# Patient Record
Sex: FEMALE | Race: WHITE | NOT HISPANIC OR LATINO
[De-identification: names, ages, dates, MRNs, and addresses within clinical notes are randomized per-mention and may not be internally consistent; named-entity substitution may affect disease eponyms.]

---

## 2017-01-04 ENCOUNTER — APPOINTMENT (OUTPATIENT)
Dept: FAMILY MEDICINE | Facility: CLINIC | Age: 52
End: 2017-01-04

## 2017-01-04 VITALS
WEIGHT: 135 LBS | BODY MASS INDEX: 20.46 KG/M2 | SYSTOLIC BLOOD PRESSURE: 112 MMHG | DIASTOLIC BLOOD PRESSURE: 70 MMHG | HEIGHT: 68 IN

## 2017-01-04 VITALS — TEMPERATURE: 97.8 F

## 2017-01-08 ENCOUNTER — APPOINTMENT (OUTPATIENT)
Dept: FAMILY MEDICINE | Facility: CLINIC | Age: 52
End: 2017-01-08

## 2017-01-08 VITALS
HEIGHT: 68 IN | SYSTOLIC BLOOD PRESSURE: 110 MMHG | DIASTOLIC BLOOD PRESSURE: 80 MMHG | BODY MASS INDEX: 20.46 KG/M2 | WEIGHT: 135 LBS

## 2017-01-31 ENCOUNTER — MEDICATION RENEWAL (OUTPATIENT)
Age: 52
End: 2017-01-31

## 2017-08-04 ENCOUNTER — APPOINTMENT (OUTPATIENT)
Dept: FAMILY MEDICINE | Facility: CLINIC | Age: 52
End: 2017-08-04
Payer: COMMERCIAL

## 2017-08-04 VITALS
HEIGHT: 68 IN | SYSTOLIC BLOOD PRESSURE: 100 MMHG | DIASTOLIC BLOOD PRESSURE: 60 MMHG | BODY MASS INDEX: 20 KG/M2 | WEIGHT: 132 LBS

## 2017-08-04 PROCEDURE — 99396 PREV VISIT EST AGE 40-64: CPT | Mod: 25

## 2017-08-04 PROCEDURE — 36415 COLL VENOUS BLD VENIPUNCTURE: CPT

## 2017-08-04 PROCEDURE — 93000 ELECTROCARDIOGRAM COMPLETE: CPT

## 2017-08-04 RX ORDER — LEVOCETIRIZINE DIHYDROCHLORIDE 5 MG/1
5 TABLET ORAL DAILY
Qty: 14 | Refills: 0 | Status: DISCONTINUED | COMMUNITY
Start: 2017-01-04 | End: 2017-08-04

## 2017-08-05 LAB
ALBUMIN SERPL ELPH-MCNC: 4.2 G/DL
ALP BLD-CCNC: 97 U/L
ALT SERPL-CCNC: 12 U/L
ANION GAP SERPL CALC-SCNC: 11 MMOL/L
APPEARANCE: CLEAR
AST SERPL-CCNC: 15 U/L
BACTERIA: NEGATIVE
BASOPHILS # BLD AUTO: 0.03 K/UL
BASOPHILS NFR BLD AUTO: 0.6 %
BILIRUB SERPL-MCNC: 0.6 MG/DL
BILIRUBIN URINE: NEGATIVE
BLOOD URINE: NEGATIVE
BUN SERPL-MCNC: 15 MG/DL
CALCIUM SERPL-MCNC: 9.2 MG/DL
CHLORIDE SERPL-SCNC: 105 MMOL/L
CHOLEST SERPL-MCNC: 201 MG/DL
CHOLEST/HDLC SERPL: 3.5 RATIO
CO2 SERPL-SCNC: 28 MMOL/L
COLOR: YELLOW
CREAT SERPL-MCNC: 0.77 MG/DL
EOSINOPHIL # BLD AUTO: 0.12 K/UL
EOSINOPHIL NFR BLD AUTO: 2.2 %
GLUCOSE QUALITATIVE U: NORMAL MG/DL
GLUCOSE SERPL-MCNC: 97 MG/DL
HCT VFR BLD CALC: 40.7 %
HDLC SERPL-MCNC: 57 MG/DL
HGB BLD-MCNC: 13 G/DL
HYALINE CASTS: 2 /LPF
IMM GRANULOCYTES NFR BLD AUTO: 0 %
KETONES URINE: NEGATIVE
LDLC SERPL CALC-MCNC: 127 MG/DL
LEUKOCYTE ESTERASE URINE: ABNORMAL
LYMPHOCYTES # BLD AUTO: 2.29 K/UL
LYMPHOCYTES NFR BLD AUTO: 42.6 %
MAN DIFF?: NORMAL
MCHC RBC-ENTMCNC: 31 PG
MCHC RBC-ENTMCNC: 31.9 GM/DL
MCV RBC AUTO: 96.9 FL
MICROSCOPIC-UA: NORMAL
MONOCYTES # BLD AUTO: 0.46 K/UL
MONOCYTES NFR BLD AUTO: 8.6 %
NEUTROPHILS # BLD AUTO: 2.47 K/UL
NEUTROPHILS NFR BLD AUTO: 46 %
NITRITE URINE: NEGATIVE
PH URINE: 6.5
PLATELET # BLD AUTO: 249 K/UL
POTASSIUM SERPL-SCNC: 4.3 MMOL/L
PROT SERPL-MCNC: 7.3 G/DL
PROTEIN URINE: NEGATIVE MG/DL
RBC # BLD: 4.2 M/UL
RBC # FLD: 12.5 %
RED BLOOD CELLS URINE: 3 /HPF
SODIUM SERPL-SCNC: 144 MMOL/L
SPECIFIC GRAVITY URINE: 1.02
SQUAMOUS EPITHELIAL CELLS: 2 /HPF
TRIGL SERPL-MCNC: 86 MG/DL
TSH SERPL-ACNC: 2.78 UIU/ML
UROBILINOGEN URINE: NORMAL MG/DL
WBC # FLD AUTO: 5.37 K/UL
WHITE BLOOD CELLS URINE: 3 /HPF

## 2017-09-06 ENCOUNTER — MEDICATION RENEWAL (OUTPATIENT)
Age: 52
End: 2017-09-06

## 2018-01-05 ENCOUNTER — APPOINTMENT (OUTPATIENT)
Dept: FAMILY MEDICINE | Facility: CLINIC | Age: 53
End: 2018-01-05
Payer: COMMERCIAL

## 2018-01-05 VITALS
OXYGEN SATURATION: 97 % | RESPIRATION RATE: 16 BRPM | TEMPERATURE: 100.4 F | HEART RATE: 90 BPM | WEIGHT: 135 LBS | DIASTOLIC BLOOD PRESSURE: 78 MMHG | HEIGHT: 68 IN | SYSTOLIC BLOOD PRESSURE: 118 MMHG | BODY MASS INDEX: 20.46 KG/M2

## 2018-01-05 DIAGNOSIS — J06.9 ACUTE UPPER RESPIRATORY INFECTION, UNSPECIFIED: ICD-10-CM

## 2018-01-05 PROCEDURE — 99213 OFFICE O/P EST LOW 20 MIN: CPT

## 2018-01-05 RX ORDER — OMEPRAZOLE 40 MG/1
40 CAPSULE, DELAYED RELEASE ORAL
Qty: 30 | Refills: 0 | Status: DISCONTINUED | COMMUNITY
Start: 2017-08-04 | End: 2018-01-05

## 2018-01-24 ENCOUNTER — MEDICATION RENEWAL (OUTPATIENT)
Age: 53
End: 2018-01-24

## 2018-07-24 ENCOUNTER — APPOINTMENT (OUTPATIENT)
Dept: FAMILY MEDICINE | Facility: CLINIC | Age: 53
End: 2018-07-24
Payer: COMMERCIAL

## 2018-07-24 VITALS
BODY MASS INDEX: 20.46 KG/M2 | DIASTOLIC BLOOD PRESSURE: 80 MMHG | WEIGHT: 135 LBS | HEIGHT: 68 IN | SYSTOLIC BLOOD PRESSURE: 110 MMHG | TEMPERATURE: 98.2 F

## 2018-07-24 DIAGNOSIS — H83.09 LABYRINTHITIS, UNSPECIFIED EAR: ICD-10-CM

## 2018-07-24 PROCEDURE — 99213 OFFICE O/P EST LOW 20 MIN: CPT

## 2018-07-24 RX ORDER — SODIUM SULFATE, POTASSIUM SULFATE, MAGNESIUM SULFATE 17.5; 3.13; 1.6 G/ML; G/ML; G/ML
17.5-3.13-1.6 SOLUTION, CONCENTRATE ORAL
Qty: 354 | Refills: 0 | Status: DISCONTINUED | COMMUNITY
Start: 2018-05-03

## 2018-08-20 ENCOUNTER — MEDICATION RENEWAL (OUTPATIENT)
Age: 53
End: 2018-08-20

## 2018-09-09 ENCOUNTER — NON-APPOINTMENT (OUTPATIENT)
Age: 53
End: 2018-09-09

## 2018-09-09 ENCOUNTER — APPOINTMENT (OUTPATIENT)
Dept: FAMILY MEDICINE | Facility: CLINIC | Age: 53
End: 2018-09-09
Payer: COMMERCIAL

## 2018-09-09 VITALS
HEART RATE: 58 BPM | BODY MASS INDEX: 20.46 KG/M2 | WEIGHT: 135 LBS | HEIGHT: 68 IN | OXYGEN SATURATION: 96 % | SYSTOLIC BLOOD PRESSURE: 100 MMHG | DIASTOLIC BLOOD PRESSURE: 66 MMHG | RESPIRATION RATE: 16 BRPM

## 2018-09-09 DIAGNOSIS — R51 HEADACHE: ICD-10-CM

## 2018-09-09 PROCEDURE — 90686 IIV4 VACC NO PRSV 0.5 ML IM: CPT

## 2018-09-09 PROCEDURE — G0008: CPT

## 2018-09-09 PROCEDURE — 93000 ELECTROCARDIOGRAM COMPLETE: CPT

## 2018-09-09 PROCEDURE — 36415 COLL VENOUS BLD VENIPUNCTURE: CPT

## 2018-09-09 PROCEDURE — 99396 PREV VISIT EST AGE 40-64: CPT | Mod: 25

## 2018-09-10 LAB
25(OH)D3 SERPL-MCNC: 23.6 NG/ML
ALBUMIN SERPL ELPH-MCNC: 4.4 G/DL
ALP BLD-CCNC: 113 U/L
ALT SERPL-CCNC: 13 U/L
ANION GAP SERPL CALC-SCNC: 14 MMOL/L
APPEARANCE: CLEAR
AST SERPL-CCNC: 16 U/L
BACTERIA: NEGATIVE
BASOPHILS # BLD AUTO: 0.01 K/UL
BASOPHILS NFR BLD AUTO: 0.2 %
BILIRUB SERPL-MCNC: 0.3 MG/DL
BILIRUBIN URINE: NEGATIVE
BLOOD URINE: NEGATIVE
BUN SERPL-MCNC: 17 MG/DL
CALCIUM SERPL-MCNC: 9.4 MG/DL
CHLORIDE SERPL-SCNC: 105 MMOL/L
CHOLEST SERPL-MCNC: 194 MG/DL
CHOLEST/HDLC SERPL: 3.3 RATIO
CO2 SERPL-SCNC: 25 MMOL/L
COLOR: YELLOW
CREAT SERPL-MCNC: 0.77 MG/DL
EOSINOPHIL # BLD AUTO: 0.16 K/UL
EOSINOPHIL NFR BLD AUTO: 3.3 %
GLUCOSE QUALITATIVE U: NEGATIVE MG/DL
GLUCOSE SERPL-MCNC: 96 MG/DL
HBA1C MFR BLD HPLC: 5.7 %
HCT VFR BLD CALC: 40.1 %
HDLC SERPL-MCNC: 58 MG/DL
HGB BLD-MCNC: 12.7 G/DL
HYALINE CASTS: 2 /LPF
IMM GRANULOCYTES NFR BLD AUTO: 0.2 %
KETONES URINE: NEGATIVE
LDLC SERPL CALC-MCNC: 124 MG/DL
LEUKOCYTE ESTERASE URINE: NEGATIVE
LYMPHOCYTES # BLD AUTO: 2.2 K/UL
LYMPHOCYTES NFR BLD AUTO: 45.1 %
MAN DIFF?: NORMAL
MCHC RBC-ENTMCNC: 30.9 PG
MCHC RBC-ENTMCNC: 31.7 GM/DL
MCV RBC AUTO: 97.6 FL
MICROSCOPIC-UA: NORMAL
MONOCYTES # BLD AUTO: 0.36 K/UL
MONOCYTES NFR BLD AUTO: 7.4 %
NEUTROPHILS # BLD AUTO: 2.14 K/UL
NEUTROPHILS NFR BLD AUTO: 43.8 %
NITRITE URINE: NEGATIVE
PH URINE: 5.5
PLATELET # BLD AUTO: 253 K/UL
POTASSIUM SERPL-SCNC: 4.4 MMOL/L
PROT SERPL-MCNC: 6.7 G/DL
PROTEIN URINE: NEGATIVE MG/DL
RBC # BLD: 4.11 M/UL
RBC # FLD: 12.9 %
RED BLOOD CELLS URINE: 1 /HPF
SODIUM SERPL-SCNC: 144 MMOL/L
SPECIFIC GRAVITY URINE: 1.02
SQUAMOUS EPITHELIAL CELLS: 2 /HPF
TRIGL SERPL-MCNC: 62 MG/DL
TSH SERPL-ACNC: 2.23 UIU/ML
UROBILINOGEN URINE: NEGATIVE MG/DL
WBC # FLD AUTO: 4.88 K/UL
WHITE BLOOD CELLS URINE: 1 /HPF

## 2018-11-18 ENCOUNTER — APPOINTMENT (OUTPATIENT)
Dept: FAMILY MEDICINE | Facility: CLINIC | Age: 53
End: 2018-11-18
Payer: COMMERCIAL

## 2018-11-18 VITALS
SYSTOLIC BLOOD PRESSURE: 90 MMHG | BODY MASS INDEX: 20.88 KG/M2 | DIASTOLIC BLOOD PRESSURE: 60 MMHG | WEIGHT: 133 LBS | HEIGHT: 67 IN

## 2018-11-18 DIAGNOSIS — J30.9 ALLERGIC RHINITIS, UNSPECIFIED: ICD-10-CM

## 2018-11-18 PROCEDURE — 99213 OFFICE O/P EST LOW 20 MIN: CPT

## 2018-11-18 RX ORDER — MECLIZINE HYDROCHLORIDE 25 MG/1
25 TABLET ORAL
Qty: 15 | Refills: 0 | Status: DISCONTINUED | COMMUNITY
Start: 2018-09-19

## 2018-11-27 ENCOUNTER — APPOINTMENT (OUTPATIENT)
Dept: FAMILY MEDICINE | Facility: CLINIC | Age: 53
End: 2018-11-27
Payer: COMMERCIAL

## 2018-11-27 VITALS
HEIGHT: 67 IN | DIASTOLIC BLOOD PRESSURE: 60 MMHG | HEART RATE: 72 BPM | BODY MASS INDEX: 21.35 KG/M2 | RESPIRATION RATE: 16 BRPM | WEIGHT: 136 LBS | OXYGEN SATURATION: 96 % | SYSTOLIC BLOOD PRESSURE: 110 MMHG

## 2018-11-27 DIAGNOSIS — B34.9 VIRAL INFECTION, UNSPECIFIED: ICD-10-CM

## 2018-11-27 PROCEDURE — 99213 OFFICE O/P EST LOW 20 MIN: CPT

## 2018-11-27 RX ORDER — AMOXICILLIN 500 MG/1
500 TABLET, FILM COATED ORAL
Qty: 21 | Refills: 0 | Status: DISCONTINUED | COMMUNITY
Start: 2018-08-20

## 2019-05-09 ENCOUNTER — APPOINTMENT (OUTPATIENT)
Dept: FAMILY MEDICINE | Facility: CLINIC | Age: 54
End: 2019-05-09
Payer: COMMERCIAL

## 2019-05-09 VITALS
HEIGHT: 67 IN | OXYGEN SATURATION: 99 % | DIASTOLIC BLOOD PRESSURE: 68 MMHG | HEART RATE: 61 BPM | SYSTOLIC BLOOD PRESSURE: 100 MMHG | BODY MASS INDEX: 21.5 KG/M2 | WEIGHT: 137 LBS | RESPIRATION RATE: 16 BRPM

## 2019-05-09 DIAGNOSIS — R53.81 OTHER MALAISE: ICD-10-CM

## 2019-05-09 PROCEDURE — 99214 OFFICE O/P EST MOD 30 MIN: CPT | Mod: 25

## 2019-05-09 PROCEDURE — 36415 COLL VENOUS BLD VENIPUNCTURE: CPT

## 2019-05-09 RX ORDER — PREDNISONE 20 MG/1
20 TABLET ORAL
Qty: 12 | Refills: 0 | Status: DISCONTINUED | COMMUNITY
Start: 2018-11-27 | End: 2019-05-09

## 2019-05-09 RX ORDER — AZITHROMYCIN 250 MG/1
250 TABLET, FILM COATED ORAL
Qty: 1 | Refills: 0 | Status: DISCONTINUED | COMMUNITY
Start: 2018-11-27 | End: 2019-05-09

## 2019-05-10 LAB
ALBUMIN SERPL ELPH-MCNC: 4.2 G/DL
ALP BLD-CCNC: 117 U/L
ALT SERPL-CCNC: 16 U/L
ANION GAP SERPL CALC-SCNC: 9 MMOL/L
AST SERPL-CCNC: 20 U/L
BILIRUB SERPL-MCNC: 0.3 MG/DL
BUN SERPL-MCNC: 10 MG/DL
CALCIUM SERPL-MCNC: 9.4 MG/DL
CHLORIDE SERPL-SCNC: 109 MMOL/L
CO2 SERPL-SCNC: 26 MMOL/L
CREAT SERPL-MCNC: 0.65 MG/DL
GLUCOSE SERPL-MCNC: 69 MG/DL
POTASSIUM SERPL-SCNC: 4.7 MMOL/L
PROT SERPL-MCNC: 6.6 G/DL
SODIUM SERPL-SCNC: 144 MMOL/L

## 2019-09-29 ENCOUNTER — APPOINTMENT (OUTPATIENT)
Dept: FAMILY MEDICINE | Facility: CLINIC | Age: 54
End: 2019-09-29
Payer: COMMERCIAL

## 2019-09-29 VITALS
OXYGEN SATURATION: 97 % | WEIGHT: 133 LBS | SYSTOLIC BLOOD PRESSURE: 100 MMHG | BODY MASS INDEX: 20.88 KG/M2 | RESPIRATION RATE: 16 BRPM | HEART RATE: 71 BPM | DIASTOLIC BLOOD PRESSURE: 66 MMHG | HEIGHT: 67 IN

## 2019-09-29 LAB — CYTOLOGY CVX/VAG DOC THIN PREP: NORMAL

## 2019-09-29 PROCEDURE — 99396 PREV VISIT EST AGE 40-64: CPT | Mod: 25

## 2019-09-29 PROCEDURE — 36415 COLL VENOUS BLD VENIPUNCTURE: CPT

## 2019-09-29 PROCEDURE — 93000 ELECTROCARDIOGRAM COMPLETE: CPT

## 2019-11-29 ENCOUNTER — APPOINTMENT (OUTPATIENT)
Dept: CARDIOLOGY | Facility: CLINIC | Age: 54
End: 2019-11-29
Payer: COMMERCIAL

## 2019-11-29 VITALS
DIASTOLIC BLOOD PRESSURE: 67 MMHG | HEART RATE: 55 BPM | BODY MASS INDEX: 20.88 KG/M2 | WEIGHT: 133 LBS | OXYGEN SATURATION: 97 % | SYSTOLIC BLOOD PRESSURE: 116 MMHG | HEIGHT: 67 IN

## 2019-11-29 PROCEDURE — 99203 OFFICE O/P NEW LOW 30 MIN: CPT

## 2019-11-29 NOTE — REVIEW OF SYSTEMS
[Fever] : no fever [Chills] : no chills [Abdominal Pain] : no abdominal pain [Heartburn] : no heartburn [Dysphagia] : no dysphagia [Joint Pain] : no joint pain [Muscle Cramps] : no muscle cramps [Skin: A Rash] : no rash: [Skin Lesions] : no skin lesions [Dizziness] : no dizziness [Convulsions] : no convulsions [Confusion] : no confusion was observed [Anxiety] : no anxiety [Easy Bleeding] : no tendency for easy bleeding [Easy Bruising] : no tendency for easy bruising

## 2019-11-29 NOTE — HISTORY OF PRESENT ILLNESS
[FreeTextEntry1] : 54F who presents for cardiac evaluation. Mother recently diagnosed with multiple myeloma so under a lot of stress. She notes being "paranoid" about her cardiac health.  Other than an occasional palpitations, she denies overt chest pain, shortness of breath, dizziness, lightheadedness, syncope. She had a negative treadmill stress test around 2016, going 10.5 minutes on the treadmill without ischemia (per documentation). She does weight training a few days a week without symptoms. \par \par Nonsmoker. 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 \par \par ECG: SR with subtle inferior ST elevations (old)\par \par

## 2019-11-29 NOTE — PHYSICAL EXAM
[General Appearance - Well Developed] : well developed [Normal Appearance] : normal appearance [Well Groomed] : well groomed [General Appearance - Well Nourished] : well nourished [No Deformities] : no deformities [General Appearance - In No Acute Distress] : no acute distress [Normal Conjunctiva] : the conjunctiva exhibited no abnormalities [Eyelids - No Xanthelasma] : the eyelids demonstrated no xanthelasmas [Normal Oral Mucosa] : normal oral mucosa [No Oral Pallor] : no oral pallor [Normal Jugular Venous A Waves Present] : normal jugular venous A waves present [No Oral Cyanosis] : no oral cyanosis [Normal Jugular Venous V Waves Present] : normal jugular venous V waves present [No Jugular Venous Rider A Waves] : no jugular venous rider A waves [Heart Rate And Rhythm] : heart rate and rhythm were normal [Heart Sounds] : normal S1 and S2 [Murmurs] : no murmurs present [Edema] : no peripheral edema present [Respiration, Rhythm And Depth] : normal respiratory rhythm and effort [Exaggerated Use Of Accessory Muscles For Inspiration] : no accessory muscle use [Auscultation Breath Sounds / Voice Sounds] : lungs were clear to auscultation bilaterally [Abdomen Soft] : soft [Abdomen Tenderness] : non-tender [Abdomen Mass (___ Cm)] : no abdominal mass palpated [Abnormal Walk] : normal gait [Gait - Sufficient For Exercise Testing] : the gait was sufficient for exercise testing [Nail Clubbing] : no clubbing of the fingernails [Cyanosis, Localized] : no localized cyanosis [Petechial Hemorrhages (___cm)] : no petechial hemorrhages [Skin Color & Pigmentation] : normal skin color and pigmentation [] : no rash [No Venous Stasis] : no venous stasis [Skin Lesions] : no skin lesions [No Skin Ulcers] : no skin ulcer [No Xanthoma] : no  xanthoma was observed [Oriented To Time, Place, And Person] : oriented to person, place, and time [Affect] : the affect was normal [Mood] : the mood was normal [No Anxiety] : not feeling anxious

## 2019-11-29 NOTE — DISCUSSION/SUMMARY
[FreeTextEntry1] : 54F for cardiac evaluation.\par \par She is in good health, normal BP, lipids, a1c, weight. Nonsmoker. Exercises regularly. Had reportedly negative stress test in 2016. \par \par Check echo\par Continue to encourage healthy lifestyle\par Stress and anxiety management

## 2019-12-23 ENCOUNTER — APPOINTMENT (OUTPATIENT)
Dept: INTERNAL MEDICINE | Facility: CLINIC | Age: 54
End: 2019-12-23
Payer: COMMERCIAL

## 2019-12-23 PROCEDURE — 93306 TTE W/DOPPLER COMPLETE: CPT

## 2020-03-04 ENCOUNTER — RX RENEWAL (OUTPATIENT)
Age: 55
End: 2020-03-04

## 2020-06-09 ENCOUNTER — APPOINTMENT (OUTPATIENT)
Dept: FAMILY MEDICINE | Facility: CLINIC | Age: 55
End: 2020-06-09
Payer: COMMERCIAL

## 2020-06-09 VITALS
SYSTOLIC BLOOD PRESSURE: 100 MMHG | DIASTOLIC BLOOD PRESSURE: 62 MMHG | RESPIRATION RATE: 16 BRPM | WEIGHT: 132 LBS | OXYGEN SATURATION: 97 % | HEART RATE: 55 BPM | BODY MASS INDEX: 20.67 KG/M2

## 2020-06-09 DIAGNOSIS — R00.2 PALPITATIONS: ICD-10-CM

## 2020-06-09 PROCEDURE — 99214 OFFICE O/P EST MOD 30 MIN: CPT | Mod: 25

## 2020-06-09 PROCEDURE — 36415 COLL VENOUS BLD VENIPUNCTURE: CPT

## 2020-08-30 ENCOUNTER — APPOINTMENT (OUTPATIENT)
Dept: INTERNAL MEDICINE | Facility: CLINIC | Age: 55
End: 2020-08-30
Payer: COMMERCIAL

## 2020-08-30 VITALS
WEIGHT: 131 LBS | DIASTOLIC BLOOD PRESSURE: 72 MMHG | SYSTOLIC BLOOD PRESSURE: 132 MMHG | BODY MASS INDEX: 20.52 KG/M2 | OXYGEN SATURATION: 98 % | HEART RATE: 68 BPM

## 2020-08-30 DIAGNOSIS — Z11.59 ENCOUNTER FOR SCREENING FOR OTHER VIRAL DISEASES: ICD-10-CM

## 2020-08-30 PROCEDURE — 99214 OFFICE O/P EST MOD 30 MIN: CPT | Mod: 25

## 2020-08-30 PROCEDURE — 90686 IIV4 VACC NO PRSV 0.5 ML IM: CPT

## 2020-08-30 PROCEDURE — 90471 IMMUNIZATION ADMIN: CPT

## 2020-08-30 PROCEDURE — 36415 COLL VENOUS BLD VENIPUNCTURE: CPT

## 2020-08-30 NOTE — HISTORY OF PRESENT ILLNESS
[FreeTextEntry1] : vaginal discharge [de-identified] : Pt evaluated by gyn yesterday fo9r unusual discharge. Is concerned because she had unprotected sex. wants blood work for std, and covid antibodies

## 2020-08-30 NOTE — PHYSICAL EXAM
[No Acute Distress] : no acute distress [Well Nourished] : well nourished [Well Developed] : well developed [Well-Appearing] : well-appearing [PERRL] : pupils equal round and reactive to light [Normal Sclera/Conjunctiva] : normal sclera/conjunctiva [EOMI] : extraocular movements intact [Normal Outer Ear/Nose] : the outer ears and nose were normal in appearance [Normal Oropharynx] : the oropharynx was normal [No JVD] : no jugular venous distention [No Lymphadenopathy] : no lymphadenopathy [Supple] : supple [No Respiratory Distress] : no respiratory distress  [Thyroid Normal, No Nodules] : the thyroid was normal and there were no nodules present [No Accessory Muscle Use] : no accessory muscle use [Normal Rate] : normal rate  [Clear to Auscultation] : lungs were clear to auscultation bilaterally [Normal S1, S2] : normal S1 and S2 [Regular Rhythm] : with a regular rhythm [No Murmur] : no murmur heard [No Carotid Bruits] : no carotid bruits [No Abdominal Bruit] : a ~M bruit was not heard ~T in the abdomen [Pedal Pulses Present] : the pedal pulses are present [No Varicosities] : no varicosities [No Edema] : there was no peripheral edema [No Extremity Clubbing/Cyanosis] : no extremity clubbing/cyanosis [No Palpable Aorta] : no palpable aorta [Soft] : abdomen soft [No Masses] : no abdominal mass palpated [Non Tender] : non-tender [Non-distended] : non-distended [No HSM] : no HSM [Normal Bowel Sounds] : normal bowel sounds [Normal Anterior Cervical Nodes] : no anterior cervical lymphadenopathy [Normal Posterior Cervical Nodes] : no posterior cervical lymphadenopathy [No CVA Tenderness] : no CVA  tenderness [No Spinal Tenderness] : no spinal tenderness [Grossly Normal Strength/Tone] : grossly normal strength/tone [No Joint Swelling] : no joint swelling [No Rash] : no rash [Coordination Grossly Intact] : coordination grossly intact [No Focal Deficits] : no focal deficits [Normal Affect] : the affect was normal [Normal Gait] : normal gait [Normal Insight/Judgement] : insight and judgment were intact

## 2020-08-31 LAB
HCV AB SER QL: NONREACTIVE
HCV S/CO RATIO: 0.11 S/CO
HIV1+2 AB SPEC QL IA.RAPID: NONREACTIVE

## 2020-09-01 LAB
RPR SER-TITR: NORMAL
SARS-COV-2 IGG SERPL IA-ACNC: <0.1 INDEX
SARS-COV-2 IGG SERPL QL IA: NEGATIVE

## 2020-09-24 ENCOUNTER — APPOINTMENT (OUTPATIENT)
Dept: FAMILY MEDICINE | Facility: CLINIC | Age: 55
End: 2020-09-24

## 2020-11-08 ENCOUNTER — APPOINTMENT (OUTPATIENT)
Dept: FAMILY MEDICINE | Facility: CLINIC | Age: 55
End: 2020-11-08
Payer: COMMERCIAL

## 2020-11-08 VITALS
WEIGHT: 129 LBS | SYSTOLIC BLOOD PRESSURE: 120 MMHG | HEART RATE: 72 BPM | DIASTOLIC BLOOD PRESSURE: 70 MMHG | OXYGEN SATURATION: 96 % | BODY MASS INDEX: 20.25 KG/M2 | HEIGHT: 67 IN

## 2020-11-08 DIAGNOSIS — L30.8 OTHER SPECIFIED DERMATITIS: ICD-10-CM

## 2020-11-08 PROCEDURE — 99072 ADDL SUPL MATRL&STAF TM PHE: CPT

## 2020-11-08 PROCEDURE — 36415 COLL VENOUS BLD VENIPUNCTURE: CPT

## 2020-11-08 PROCEDURE — 99396 PREV VISIT EST AGE 40-64: CPT | Mod: 25

## 2020-11-08 RX ORDER — METRONIDAZOLE 7.5 MG/G
0.75 GEL TOPICAL
Qty: 45 | Refills: 0 | Status: DISCONTINUED | COMMUNITY
Start: 2020-10-24

## 2020-11-08 RX ORDER — TRIAMCINOLONE ACETONIDE 1 MG/G
0.1 OINTMENT TOPICAL
Qty: 30 | Refills: 0 | Status: DISCONTINUED | COMMUNITY
Start: 2020-10-17

## 2020-11-09 LAB
25(OH)D3 SERPL-MCNC: 35.7 NG/ML
ALBUMIN SERPL ELPH-MCNC: 4.1 G/DL
ALP BLD-CCNC: 115 U/L
ALT SERPL-CCNC: 15 U/L
ANION GAP SERPL CALC-SCNC: 17 MMOL/L
APPEARANCE: CLEAR
AST SERPL-CCNC: 14 U/L
BACTERIA: ABNORMAL
BASOPHILS # BLD AUTO: 0.03 K/UL
BASOPHILS NFR BLD AUTO: 0.6 %
BILIRUB SERPL-MCNC: 0.5 MG/DL
BILIRUBIN URINE: NEGATIVE
BLOOD URINE: NEGATIVE
BUN SERPL-MCNC: 12 MG/DL
CALCIUM SERPL-MCNC: 9.4 MG/DL
CHLORIDE SERPL-SCNC: 106 MMOL/L
CHOLEST SERPL-MCNC: 183 MG/DL
CO2 SERPL-SCNC: 22 MMOL/L
COLOR: YELLOW
CREAT SERPL-MCNC: 0.68 MG/DL
EOSINOPHIL # BLD AUTO: 0.1 K/UL
EOSINOPHIL NFR BLD AUTO: 1.9 %
ESTIMATED AVERAGE GLUCOSE: 120 MG/DL
GLUCOSE QUALITATIVE U: NEGATIVE
GLUCOSE SERPL-MCNC: 101 MG/DL
HBA1C MFR BLD HPLC: 5.8 %
HCT VFR BLD CALC: 41.1 %
HDLC SERPL-MCNC: 56 MG/DL
HGB BLD-MCNC: 12.9 G/DL
HYALINE CASTS: 0 /LPF
IMM GRANULOCYTES NFR BLD AUTO: 0.2 %
KETONES URINE: NEGATIVE
LDLC SERPL CALC-MCNC: 113 MG/DL
LEUKOCYTE ESTERASE URINE: ABNORMAL
LYMPHOCYTES # BLD AUTO: 1.92 K/UL
LYMPHOCYTES NFR BLD AUTO: 37.2 %
MAN DIFF?: NORMAL
MCHC RBC-ENTMCNC: 31.4 GM/DL
MCHC RBC-ENTMCNC: 32 PG
MCV RBC AUTO: 102 FL
MICROSCOPIC-UA: NORMAL
MONOCYTES # BLD AUTO: 0.47 K/UL
MONOCYTES NFR BLD AUTO: 9.1 %
NEUTROPHILS # BLD AUTO: 2.63 K/UL
NEUTROPHILS NFR BLD AUTO: 51 %
NITRITE URINE: NEGATIVE
NONHDLC SERPL-MCNC: 127 MG/DL
PH URINE: 7
PLATELET # BLD AUTO: 221 K/UL
POTASSIUM SERPL-SCNC: 4.2 MMOL/L
PROT SERPL-MCNC: 6.6 G/DL
PROTEIN URINE: NORMAL
RBC # BLD: 4.03 M/UL
RBC # FLD: 12.5 %
RED BLOOD CELLS URINE: 2 /HPF
SODIUM SERPL-SCNC: 145 MMOL/L
SPECIFIC GRAVITY URINE: 1.02
SQUAMOUS EPITHELIAL CELLS: 0 /HPF
TRIGL SERPL-MCNC: 73 MG/DL
TSH SERPL-ACNC: 1.8 UIU/ML
UROBILINOGEN URINE: NORMAL
WBC # FLD AUTO: 5.16 K/UL
WHITE BLOOD CELLS URINE: 87 /HPF

## 2021-02-25 ENCOUNTER — RX RENEWAL (OUTPATIENT)
Age: 56
End: 2021-02-25

## 2021-03-15 ENCOUNTER — NON-APPOINTMENT (OUTPATIENT)
Age: 56
End: 2021-03-15

## 2021-03-15 ENCOUNTER — APPOINTMENT (OUTPATIENT)
Dept: FAMILY MEDICINE | Facility: CLINIC | Age: 56
End: 2021-03-15
Payer: COMMERCIAL

## 2021-03-15 VITALS
DIASTOLIC BLOOD PRESSURE: 70 MMHG | OXYGEN SATURATION: 98 % | SYSTOLIC BLOOD PRESSURE: 118 MMHG | HEART RATE: 67 BPM | TEMPERATURE: 96.4 F | WEIGHT: 128 LBS | HEIGHT: 67 IN | BODY MASS INDEX: 20.09 KG/M2

## 2021-03-15 LAB
BILIRUB UR QL STRIP: NEGATIVE
GLUCOSE UR-MCNC: NEGATIVE
HCG UR QL: 0.2 EU/DL
HGB UR QL STRIP.AUTO: ABNORMAL
KETONES UR-MCNC: NEGATIVE
LEUKOCYTE ESTERASE UR QL STRIP: ABNORMAL
NITRITE UR QL STRIP: NEGATIVE
PH UR STRIP: 6.5
PROT UR STRIP-MCNC: 30
SP GR UR STRIP: 1.02

## 2021-03-15 PROCEDURE — 99213 OFFICE O/P EST LOW 20 MIN: CPT | Mod: 25

## 2021-03-15 PROCEDURE — 99072 ADDL SUPL MATRL&STAF TM PHE: CPT

## 2021-03-15 PROCEDURE — 81002 URINALYSIS NONAUTO W/O SCOPE: CPT

## 2021-03-15 RX ORDER — MECLIZINE HYDROCHLORIDE 12.5 MG/1
12.5 TABLET ORAL
Qty: 30 | Refills: 0 | Status: DISCONTINUED | COMMUNITY
Start: 2018-07-24 | End: 2021-03-15

## 2021-03-15 RX ORDER — HYDROCORTISONE 25 MG/G
2.5 CREAM TOPICAL
Qty: 1 | Refills: 2 | Status: DISCONTINUED | COMMUNITY
Start: 2020-11-08 | End: 2021-03-15

## 2021-03-15 NOTE — PHYSICAL EXAM
[Well Nourished] : well nourished [Well Developed] : well developed [Normal Gait] : normal gait [Normal Affect] : the affect was normal [Normal Insight/Judgement] : insight and judgment were intact

## 2021-03-15 NOTE — HISTORY OF PRESENT ILLNESS
[FreeTextEntry8] : 55 year old female here with complaints of urinary frequency.  Patients active medications, allergies and issues were all reviewed with the patient at time of visit.\par

## 2021-05-24 ENCOUNTER — RX RENEWAL (OUTPATIENT)
Age: 56
End: 2021-05-24

## 2021-05-25 ENCOUNTER — APPOINTMENT (OUTPATIENT)
Dept: FAMILY MEDICINE | Facility: CLINIC | Age: 56
End: 2021-05-25
Payer: COMMERCIAL

## 2021-05-25 VITALS
DIASTOLIC BLOOD PRESSURE: 70 MMHG | TEMPERATURE: 97.4 F | BODY MASS INDEX: 20.09 KG/M2 | WEIGHT: 128 LBS | HEIGHT: 67 IN | HEART RATE: 57 BPM | SYSTOLIC BLOOD PRESSURE: 115 MMHG | OXYGEN SATURATION: 98 %

## 2021-05-25 DIAGNOSIS — Z23 ENCOUNTER FOR IMMUNIZATION: ICD-10-CM

## 2021-05-25 PROCEDURE — 99214 OFFICE O/P EST MOD 30 MIN: CPT | Mod: 25

## 2021-05-25 PROCEDURE — 99072 ADDL SUPL MATRL&STAF TM PHE: CPT

## 2021-05-25 PROCEDURE — 36415 COLL VENOUS BLD VENIPUNCTURE: CPT

## 2021-05-26 LAB
ALBUMIN SERPL ELPH-MCNC: 4.2 G/DL
ALP BLD-CCNC: 101 U/L
ALT SERPL-CCNC: 11 U/L
ANION GAP SERPL CALC-SCNC: 11 MMOL/L
AST SERPL-CCNC: 15 U/L
BASOPHILS # BLD AUTO: 0.03 K/UL
BASOPHILS NFR BLD AUTO: 0.5 %
BILIRUB SERPL-MCNC: 0.3 MG/DL
BUN SERPL-MCNC: 10 MG/DL
CALCIUM SERPL-MCNC: 9.8 MG/DL
CHLORIDE SERPL-SCNC: 105 MMOL/L
CHOLEST SERPL-MCNC: 165 MG/DL
CO2 SERPL-SCNC: 27 MMOL/L
CREAT SERPL-MCNC: 0.71 MG/DL
EOSINOPHIL # BLD AUTO: 0.09 K/UL
EOSINOPHIL NFR BLD AUTO: 1.6 %
GLUCOSE SERPL-MCNC: 110 MG/DL
HCT VFR BLD CALC: 38.6 %
HDLC SERPL-MCNC: 55 MG/DL
HGB BLD-MCNC: 12.7 G/DL
IMM GRANULOCYTES NFR BLD AUTO: 0.2 %
LDLC SERPL CALC-MCNC: 89 MG/DL
LYMPHOCYTES # BLD AUTO: 2.03 K/UL
LYMPHOCYTES NFR BLD AUTO: 35.8 %
MAN DIFF?: NORMAL
MCHC RBC-ENTMCNC: 32.9 GM/DL
MCHC RBC-ENTMCNC: 33.5 PG
MCV RBC AUTO: 101.8 FL
MONOCYTES # BLD AUTO: 0.49 K/UL
MONOCYTES NFR BLD AUTO: 8.6 %
NEUTROPHILS # BLD AUTO: 3.02 K/UL
NEUTROPHILS NFR BLD AUTO: 53.3 %
NONHDLC SERPL-MCNC: 110 MG/DL
PLATELET # BLD AUTO: 228 K/UL
POTASSIUM SERPL-SCNC: 4.1 MMOL/L
PROT SERPL-MCNC: 6.8 G/DL
RBC # BLD: 3.79 M/UL
RBC # FLD: 12.3 %
SODIUM SERPL-SCNC: 144 MMOL/L
TRIGL SERPL-MCNC: 104 MG/DL
TSH SERPL-ACNC: 1.62 UIU/ML
WBC # FLD AUTO: 5.67 K/UL

## 2021-06-15 ENCOUNTER — APPOINTMENT (OUTPATIENT)
Dept: FAMILY MEDICINE | Facility: CLINIC | Age: 56
End: 2021-06-15

## 2021-08-10 ENCOUNTER — APPOINTMENT (OUTPATIENT)
Dept: FAMILY MEDICINE | Facility: CLINIC | Age: 56
End: 2021-08-10
Payer: COMMERCIAL

## 2021-08-10 VITALS
HEIGHT: 67 IN | TEMPERATURE: 98 F | WEIGHT: 128 LBS | SYSTOLIC BLOOD PRESSURE: 120 MMHG | OXYGEN SATURATION: 97 % | HEART RATE: 63 BPM | DIASTOLIC BLOOD PRESSURE: 80 MMHG | BODY MASS INDEX: 20.09 KG/M2

## 2021-08-10 DIAGNOSIS — R05 COUGH: ICD-10-CM

## 2021-08-10 PROCEDURE — 99214 OFFICE O/P EST MOD 30 MIN: CPT

## 2021-08-10 RX ORDER — PHENAZOPYRIDINE 200 MG/1
200 TABLET, FILM COATED ORAL 3 TIMES DAILY
Qty: 6 | Refills: 0 | Status: DISCONTINUED | COMMUNITY
Start: 2021-03-15 | End: 2021-08-10

## 2021-08-10 RX ORDER — CIPROFLOXACIN HYDROCHLORIDE 500 MG/1
500 TABLET, FILM COATED ORAL
Qty: 10 | Refills: 0 | Status: DISCONTINUED | COMMUNITY
Start: 2021-03-15 | End: 2021-08-10

## 2021-11-15 ENCOUNTER — APPOINTMENT (OUTPATIENT)
Dept: FAMILY MEDICINE | Facility: CLINIC | Age: 56
End: 2021-11-15

## 2021-12-13 ENCOUNTER — APPOINTMENT (OUTPATIENT)
Dept: FAMILY MEDICINE | Facility: CLINIC | Age: 56
End: 2021-12-13
Payer: COMMERCIAL

## 2021-12-13 ENCOUNTER — NON-APPOINTMENT (OUTPATIENT)
Age: 56
End: 2021-12-13

## 2021-12-13 VITALS
WEIGHT: 128 LBS | HEIGHT: 67 IN | DIASTOLIC BLOOD PRESSURE: 72 MMHG | SYSTOLIC BLOOD PRESSURE: 120 MMHG | OXYGEN SATURATION: 98 % | HEART RATE: 52 BPM | TEMPERATURE: 98 F | BODY MASS INDEX: 20.09 KG/M2

## 2021-12-13 DIAGNOSIS — M62.838 OTHER MUSCLE SPASM: ICD-10-CM

## 2021-12-13 DIAGNOSIS — Z23 ENCOUNTER FOR IMMUNIZATION: ICD-10-CM

## 2021-12-13 PROCEDURE — 36415 COLL VENOUS BLD VENIPUNCTURE: CPT

## 2021-12-13 PROCEDURE — 90686 IIV4 VACC NO PRSV 0.5 ML IM: CPT

## 2021-12-13 PROCEDURE — G0008: CPT

## 2021-12-13 PROCEDURE — 99396 PREV VISIT EST AGE 40-64: CPT | Mod: 25

## 2021-12-13 PROCEDURE — 93000 ELECTROCARDIOGRAM COMPLETE: CPT | Mod: 59

## 2021-12-13 NOTE — HISTORY OF PRESENT ILLNESS
[FreeTextEntry8] : 56 year old female  here for annual well visit. Patient's blood work was drawn and medications reviewed. Patient's past medical history was reviewed, allergies verified and problems were identified and assessed. Patients medications were reviewed.\par \par mid to upper back pain, on and off for a month, now more consistent\par

## 2021-12-13 NOTE — PHYSICAL EXAM
[Well Nourished] : well nourished [Well Developed] : well developed [No Accessory Muscle Use] : no accessory muscle use [Regular Rhythm] : with a regular rhythm [Normal S1, S2] : normal S1 and S2 [Normal Gait] : normal gait [Normal Affect] : the affect was normal [Normal Insight/Judgement] : insight and judgment were intact [de-identified] : tenderness to right upper thoracic area

## 2021-12-13 NOTE — ASSESSMENT
[FreeTextEntry1] : Patient was counseled on healthy eating habits, on daily exercise and stress relief. All medications and allergies were reviewed with the patient and any adjustments necessary were made. Patient was counseled to try engage in an exercise activity for at least 30 minutes 3-4 times a week.  Patient was advised to eat a diet low in fat and carbohydrates and high in protein, with plenty of vegetables. Patient was advised to try and engage in relaxing activities whenever possible.\par The patients blood was draw in office and will be followed and assessed for any issues.  Patient was told to return to the office if any issues arise.  Unless otherwise stated, the patient is to continue all other medications as previously prescribed.\par \par back pain\par Patient was advised to rest and to use moist heat throughout the day and stretch as appropriate. Patient was advised to take all medications as prescribed. Patient was advised if symptoms persist or worsen return to office.\par \par anxiety\par Discussed diagnosis of anxiety and depression with the patient and potential outcomes/side affects of treatment versus non treatment. Medications were assessed and described at length. Side affects and black box warning were discussed.  Patient was advised to continue will all medications prescribed and the need for compliance was discussed and emphasized. Patient was advised to not stop medications without discussing with a health care provider first. Patient was advised to continue psychotherapy or seek therapy if not currently attending. Patient was educated on addictive potential of controlled substances and was counseled to use only as needed and sparingly. Patient verbalized understanding of all the above and all questions were answered.\par \par is on lexapro 5, but still anxious, will try increasing to 10 and reassess\par on clonipin as needed\par \par

## 2021-12-13 NOTE — HEALTH RISK ASSESSMENT
[Excellent] : ~his/her~  mood as  excellent [Never] : Never [Yes] : Yes [No falls in past year] : Patient reported no falls in the past year [0] : 2) Feeling down, depressed, or hopeless: Not at all (0) [PHQ-2 Negative - No further assessment needed] : PHQ-2 Negative - No further assessment needed [[id number]] : 0 [Patient reported mammogram was normal] : Patient reported mammogram was normal [Patient reported colonoscopy was normal] : Patient reported colonoscopy was normal [Alone] : lives alone [Employed] : employed [# Of Children ___] : has [unfilled] children [Fully functional (bathing, dressing, toileting, transferring, walking, feeding)] : Fully functional (bathing, dressing, toileting, transferring, walking, feeding) [Fully functional (using the telephone, shopping, preparing meals, housekeeping, doing laundry, using] : Fully functional and needs no help or supervision to perform IADLs (using the telephone, shopping, preparing meals, housekeeping, doing laundry, using transportation, managing medications and managing finances) [MammogramDate] : 12/2020 [ColonoscopyDate] : 2018 [de-identified] :

## 2021-12-15 DIAGNOSIS — N39.0 URINARY TRACT INFECTION, SITE NOT SPECIFIED: ICD-10-CM

## 2021-12-15 DIAGNOSIS — Z87.440 PERSONAL HISTORY OF URINARY (TRACT) INFECTIONS: ICD-10-CM

## 2022-02-14 ENCOUNTER — RX RENEWAL (OUTPATIENT)
Age: 57
End: 2022-02-14

## 2022-04-11 PROBLEM — Z11.59 SCREENING FOR VIRAL DISEASE: Status: ACTIVE | Noted: 2020-08-30

## 2022-05-05 ENCOUNTER — APPOINTMENT (OUTPATIENT)
Dept: FAMILY MEDICINE | Facility: CLINIC | Age: 57
End: 2022-05-05
Payer: COMMERCIAL

## 2022-05-05 VITALS
DIASTOLIC BLOOD PRESSURE: 70 MMHG | TEMPERATURE: 97.6 F | HEIGHT: 67 IN | OXYGEN SATURATION: 99 % | BODY MASS INDEX: 20.56 KG/M2 | HEART RATE: 62 BPM | WEIGHT: 131 LBS | SYSTOLIC BLOOD PRESSURE: 116 MMHG

## 2022-05-05 DIAGNOSIS — M54.9 DORSALGIA, UNSPECIFIED: ICD-10-CM

## 2022-05-05 DIAGNOSIS — M54.32 SCIATICA, LEFT SIDE: ICD-10-CM

## 2022-05-05 PROCEDURE — 99213 OFFICE O/P EST LOW 20 MIN: CPT

## 2022-05-23 ENCOUNTER — APPOINTMENT (OUTPATIENT)
Dept: CARDIOLOGY | Facility: CLINIC | Age: 57
End: 2022-05-23
Payer: COMMERCIAL

## 2022-05-23 VITALS
BODY MASS INDEX: 19.73 KG/M2 | DIASTOLIC BLOOD PRESSURE: 66 MMHG | HEART RATE: 56 BPM | TEMPERATURE: 97.6 F | SYSTOLIC BLOOD PRESSURE: 112 MMHG | OXYGEN SATURATION: 97 % | WEIGHT: 126 LBS

## 2022-05-23 DIAGNOSIS — R94.31 ABNORMAL ELECTROCARDIOGRAM [ECG] [EKG]: ICD-10-CM

## 2022-05-23 PROCEDURE — 99213 OFFICE O/P EST LOW 20 MIN: CPT

## 2022-05-23 NOTE — HISTORY OF PRESENT ILLNESS
[FreeTextEntry1] : 56F who presents for follow up\par \par Last seen Nov 2019\par She reports some upper back discomfort as of late\par Was probably worse a few months ago\par Nonexertional, no real CP, no dyspnea\par Tested + for Antibodies for COVID but does not recall having had symptoms\par Walking regularly without symptoms\par \par Mother being treated for MM- stressful for patient \par \par Nonsmoker. 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 \par \par ECG: SR with subtle inferior ST elevations (old)\par TTE (2019): 60-65%, normal\par

## 2022-05-23 NOTE — DISCUSSION/SUMMARY
[FreeTextEntry1] : 56F with upper back pain\par \par Vague upper back pains, nonexertional. Very few risk factors for CAD.\par Low suspicion, but would arrange for EST to rule out ischemia\par \par Normal BP, lipids, a1c, weight. Nonsmoker. Continue to encourage healthy lifestyle \par \par Stress and anxiety management

## 2022-07-29 ENCOUNTER — APPOINTMENT (OUTPATIENT)
Dept: CARDIOLOGY | Facility: CLINIC | Age: 57
End: 2022-07-29

## 2022-07-29 DIAGNOSIS — R94.30 ABNORMAL RESULT OF CARDIOVASCULAR FUNCTION STUDY, UNSPECIFIED: ICD-10-CM

## 2022-07-29 PROCEDURE — 93015 CV STRESS TEST SUPVJ I&R: CPT

## 2022-07-29 PROCEDURE — 99213 OFFICE O/P EST LOW 20 MIN: CPT | Mod: 25

## 2022-08-29 ENCOUNTER — OUTPATIENT (OUTPATIENT)
Dept: OUTPATIENT SERVICES | Facility: HOSPITAL | Age: 57
LOS: 1 days | End: 2022-08-29
Payer: COMMERCIAL

## 2022-08-29 ENCOUNTER — APPOINTMENT (OUTPATIENT)
Dept: CT IMAGING | Facility: CLINIC | Age: 57
End: 2022-08-29

## 2022-08-29 DIAGNOSIS — R07.89 OTHER CHEST PAIN: ICD-10-CM

## 2022-08-29 DIAGNOSIS — R94.30 ABNORMAL RESULT OF CARDIOVASCULAR FUNCTION STUDY, UNSPECIFIED: ICD-10-CM

## 2022-08-29 PROCEDURE — 75574 CT ANGIO HRT W/3D IMAGE: CPT | Mod: 26

## 2022-08-29 PROCEDURE — 75574 CT ANGIO HRT W/3D IMAGE: CPT

## 2022-09-15 DIAGNOSIS — R09.89 OTHER SPECIFIED SYMPTOMS AND SIGNS INVOLVING THE CIRCULATORY AND RESPIRATORY SYSTEMS: ICD-10-CM

## 2022-09-16 ENCOUNTER — APPOINTMENT (OUTPATIENT)
Dept: INTERNAL MEDICINE | Facility: CLINIC | Age: 57
End: 2022-09-16

## 2022-09-16 PROCEDURE — 93880 EXTRACRANIAL BILAT STUDY: CPT

## 2022-11-02 ENCOUNTER — RX RENEWAL (OUTPATIENT)
Age: 57
End: 2022-11-02

## 2022-12-08 ENCOUNTER — RX RENEWAL (OUTPATIENT)
Age: 57
End: 2022-12-08

## 2023-01-26 ENCOUNTER — APPOINTMENT (OUTPATIENT)
Dept: FAMILY MEDICINE | Facility: CLINIC | Age: 58
End: 2023-01-26
Payer: COMMERCIAL

## 2023-01-26 VITALS
OXYGEN SATURATION: 98 % | WEIGHT: 133 LBS | HEIGHT: 67 IN | HEART RATE: 56 BPM | DIASTOLIC BLOOD PRESSURE: 64 MMHG | BODY MASS INDEX: 20.88 KG/M2 | SYSTOLIC BLOOD PRESSURE: 130 MMHG

## 2023-01-26 DIAGNOSIS — E55.9 VITAMIN D DEFICIENCY, UNSPECIFIED: ICD-10-CM

## 2023-01-26 PROCEDURE — 36415 COLL VENOUS BLD VENIPUNCTURE: CPT

## 2023-01-26 PROCEDURE — 99396 PREV VISIT EST AGE 40-64: CPT | Mod: 25

## 2023-03-24 ENCOUNTER — APPOINTMENT (OUTPATIENT)
Dept: CARDIOLOGY | Facility: CLINIC | Age: 58
End: 2023-03-24
Payer: COMMERCIAL

## 2023-03-24 VITALS
HEIGHT: 67 IN | HEART RATE: 54 BPM | TEMPERATURE: 97.6 F | OXYGEN SATURATION: 99 % | WEIGHT: 135 LBS | DIASTOLIC BLOOD PRESSURE: 71 MMHG | SYSTOLIC BLOOD PRESSURE: 119 MMHG | BODY MASS INDEX: 21.19 KG/M2

## 2023-03-24 DIAGNOSIS — R07.89 OTHER CHEST PAIN: ICD-10-CM

## 2023-03-24 PROCEDURE — 99214 OFFICE O/P EST MOD 30 MIN: CPT

## 2023-03-24 RX ORDER — ASPIRIN 81 MG/1
81 TABLET ORAL DAILY
Qty: 30 | Refills: 5 | Status: ACTIVE | COMMUNITY
Start: 2023-03-24

## 2023-03-24 NOTE — DISCUSSION/SUMMARY
[FreeTextEntry1] : 57F with mild nonobstructive CAD \par Continued vague upper back pain, likely MSK in origin\par \par Will start asa 81mg\par Will start lipitor 10mg\par Trend lipids, CMP \par \par BP great\par Continue to encourage healthy lifestyle \par \par RV 3-4 to assess response to lipitor

## 2023-03-24 NOTE — HISTORY OF PRESENT ILLNESS
[FreeTextEntry1] : 57F who presents for follow up after cardiac testing\par \par Some upper back pain\par Underwent EST with increasing symptoms \par CTA with mild CAD as below\par Pt here to review results\par Still with upper back pain\par No dyspnea\par Last ,   \par \par Mother being treated for MM- stressful for patient \par \par Nonsmoker. 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 \par \par ECG: SR with subtle inferior ST elevations (old)\par TTE (2019): 60-65%, normal\par EST: 10:30 with increasing symptoms \par CTA: CAC score 43, minimal CAD LAD, RCA \par

## 2023-06-23 ENCOUNTER — APPOINTMENT (OUTPATIENT)
Dept: CARDIOLOGY | Facility: CLINIC | Age: 58
End: 2023-06-23
Payer: COMMERCIAL

## 2023-06-23 VITALS
HEART RATE: 56 BPM | DIASTOLIC BLOOD PRESSURE: 73 MMHG | BODY MASS INDEX: 20.88 KG/M2 | OXYGEN SATURATION: 98 % | SYSTOLIC BLOOD PRESSURE: 122 MMHG | HEIGHT: 67 IN | WEIGHT: 133 LBS

## 2023-06-23 PROCEDURE — 99213 OFFICE O/P EST LOW 20 MIN: CPT

## 2023-06-23 NOTE — HISTORY OF PRESENT ILLNESS
[FreeTextEntry1] : 57F who presents for follow up HLD\par \par Pt in general feels well \par Started on asa and lipitor at last visit for mild CAD noted on CTA\par Had an episode of feeling jittery for a whole day last week, may have had a caffeinated beverage that morning\par Otherwise feeling well\par Occasional upper back pain\par Denies CP, dyspnea \par \par Mother being treated for MM- stressful for patient \par \par Nonsmoker. 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 \par \par ECG: SR with subtle inferior ST elevations (old)\par TTE (2019): 60-65%, normal\par EST: 10:30 with increasing symptoms \par CTA 8/29/22: CAC score 43, minimal CAD LAD, RCA \par \par asa 81mg\par lipitor 10mg\par

## 2023-06-23 NOTE — DISCUSSION/SUMMARY
[FreeTextEntry1] : 57F with mild nonobstructive CAD \par \par Doing well on current regimen\par Trend CMP, lipids \par BP great\par Continue to encourage healthy lifestyle \par \par RV 6M

## 2023-06-26 LAB
ALBUMIN SERPL ELPH-MCNC: 4.5 G/DL
ALP BLD-CCNC: 108 U/L
ALT SERPL-CCNC: 20 U/L
ANION GAP SERPL CALC-SCNC: 11 MMOL/L
AST SERPL-CCNC: 23 U/L
BILIRUB SERPL-MCNC: 0.4 MG/DL
BUN SERPL-MCNC: 11 MG/DL
CALCIUM SERPL-MCNC: 9.3 MG/DL
CHLORIDE SERPL-SCNC: 106 MMOL/L
CHOLEST SERPL-MCNC: 140 MG/DL
CO2 SERPL-SCNC: 26 MMOL/L
CREAT SERPL-MCNC: 0.72 MG/DL
EGFR: 99 ML/MIN/1.73M2
GLUCOSE SERPL-MCNC: 93 MG/DL
HDLC SERPL-MCNC: 58 MG/DL
LDLC SERPL CALC-MCNC: 60 MG/DL
NONHDLC SERPL-MCNC: 82 MG/DL
POTASSIUM SERPL-SCNC: 4.4 MMOL/L
PROT SERPL-MCNC: 7.2 G/DL
SODIUM SERPL-SCNC: 143 MMOL/L
TRIGL SERPL-MCNC: 109 MG/DL

## 2023-09-13 ENCOUNTER — RX RENEWAL (OUTPATIENT)
Age: 58
End: 2023-09-13

## 2023-09-14 ENCOUNTER — APPOINTMENT (OUTPATIENT)
Dept: FAMILY MEDICINE | Facility: CLINIC | Age: 58
End: 2023-09-14
Payer: COMMERCIAL

## 2023-09-14 VITALS
HEIGHT: 67 IN | WEIGHT: 137.13 LBS | DIASTOLIC BLOOD PRESSURE: 63 MMHG | BODY MASS INDEX: 21.52 KG/M2 | TEMPERATURE: 97.7 F | OXYGEN SATURATION: 96 % | SYSTOLIC BLOOD PRESSURE: 114 MMHG | HEART RATE: 58 BPM

## 2023-09-14 DIAGNOSIS — G62.9 POLYNEUROPATHY, UNSPECIFIED: ICD-10-CM

## 2023-09-14 PROCEDURE — 99214 OFFICE O/P EST MOD 30 MIN: CPT | Mod: 25

## 2023-09-14 RX ORDER — CIPROFLOXACIN HYDROCHLORIDE 500 MG/1
500 TABLET, FILM COATED ORAL
Qty: 10 | Refills: 0 | Status: DISCONTINUED | COMMUNITY
Start: 2021-12-15 | End: 2023-09-14

## 2023-09-14 RX ORDER — MELOXICAM 15 MG/1
15 TABLET ORAL
Qty: 10 | Refills: 0 | Status: DISCONTINUED | COMMUNITY
Start: 2021-12-13 | End: 2023-09-14

## 2023-09-14 RX ORDER — ESCITALOPRAM OXALATE 5 MG/1
5 TABLET ORAL
Qty: 90 | Refills: 0 | Status: DISCONTINUED | COMMUNITY
Start: 2021-05-25 | End: 2023-09-14

## 2023-09-14 RX ORDER — CYCLOBENZAPRINE HYDROCHLORIDE 10 MG/1
10 TABLET, FILM COATED ORAL
Qty: 10 | Refills: 1 | Status: DISCONTINUED | COMMUNITY
Start: 2021-12-13 | End: 2023-09-14

## 2023-12-15 ENCOUNTER — NON-APPOINTMENT (OUTPATIENT)
Age: 58
End: 2023-12-15

## 2023-12-15 ENCOUNTER — APPOINTMENT (OUTPATIENT)
Dept: CARDIOLOGY | Facility: CLINIC | Age: 58
End: 2023-12-15
Payer: COMMERCIAL

## 2023-12-15 VITALS — HEIGHT: 67 IN | OXYGEN SATURATION: 96 % | WEIGHT: 136 LBS | HEART RATE: 61 BPM | BODY MASS INDEX: 21.35 KG/M2

## 2023-12-15 VITALS — SYSTOLIC BLOOD PRESSURE: 110 MMHG | DIASTOLIC BLOOD PRESSURE: 60 MMHG

## 2023-12-15 DIAGNOSIS — I25.10 ATHEROSCLEROTIC HEART DISEASE OF NATIVE CORONARY ARTERY W/OUT ANGINA PECTORIS: ICD-10-CM

## 2023-12-15 PROCEDURE — 93000 ELECTROCARDIOGRAM COMPLETE: CPT

## 2023-12-15 PROCEDURE — 99213 OFFICE O/P EST LOW 20 MIN: CPT | Mod: 25

## 2023-12-15 NOTE — HISTORY OF PRESENT ILLNESS
[FreeTextEntry1] : 58F who presents for follow up HLD  Pt in general feels well  Tolerating asa and lipitor, minmal CAD on CTA  Occasional upper back pain Denies CP, dyspnea   Mother being treated for MM- stressful for patient   Nonsmoker. 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   ECG: SR with subtle inferior ST elevations (old) TTE (2019): 60-65%, normal EST: 10:30 with increasing symptoms  CTA 8/29/22: CAC score 43, minimal CAD LAD, RCA   asa 81mg lipitor 10mg

## 2023-12-15 NOTE — DISCUSSION/SUMMARY
[FreeTextEntry1] : 58F with mild nonobstructive CAD   Doing well on current regimen, asymptomatic  Trend CMP, lipids  BP great Continue to encourage healthy lifestyle   RV 6M  [EKG obtained to assist in diagnosis and management of assessed problem(s)] : EKG obtained to assist in diagnosis and management of assessed problem(s)

## 2023-12-18 DIAGNOSIS — R74.01 ELEVATION OF LEVELS OF LIVER TRANSAMINASE LEVELS: ICD-10-CM

## 2023-12-18 LAB
ALBUMIN SERPL ELPH-MCNC: 4.3 G/DL
ALP BLD-CCNC: 108 U/L
ALT SERPL-CCNC: 80 U/L
ANION GAP SERPL CALC-SCNC: 10 MMOL/L
AST SERPL-CCNC: 48 U/L
BILIRUB SERPL-MCNC: 0.3 MG/DL
BUN SERPL-MCNC: 18 MG/DL
CALCIUM SERPL-MCNC: 9 MG/DL
CHLORIDE SERPL-SCNC: 107 MMOL/L
CHOLEST SERPL-MCNC: 140 MG/DL
CO2 SERPL-SCNC: 26 MMOL/L
CREAT SERPL-MCNC: 0.71 MG/DL
EGFR: 100 ML/MIN/1.73M2
GLUCOSE SERPL-MCNC: 100 MG/DL
HDLC SERPL-MCNC: 59 MG/DL
LDLC SERPL CALC-MCNC: 70 MG/DL
NONHDLC SERPL-MCNC: 81 MG/DL
POTASSIUM SERPL-SCNC: 4.3 MMOL/L
PROT SERPL-MCNC: 6.8 G/DL
SODIUM SERPL-SCNC: 143 MMOL/L
TRIGL SERPL-MCNC: 48 MG/DL

## 2024-01-05 ENCOUNTER — APPOINTMENT (OUTPATIENT)
Dept: FAMILY MEDICINE | Facility: CLINIC | Age: 59
End: 2024-01-05
Payer: COMMERCIAL

## 2024-01-05 VITALS
TEMPERATURE: 98.1 F | DIASTOLIC BLOOD PRESSURE: 65 MMHG | WEIGHT: 135 LBS | HEIGHT: 67 IN | HEART RATE: 64 BPM | OXYGEN SATURATION: 96 % | SYSTOLIC BLOOD PRESSURE: 114 MMHG | BODY MASS INDEX: 21.19 KG/M2

## 2024-01-05 DIAGNOSIS — M54.50 LOW BACK PAIN, UNSPECIFIED: ICD-10-CM

## 2024-01-05 DIAGNOSIS — K21.9 GASTRO-ESOPHAGEAL REFLUX DISEASE W/OUT ESOPHAGITIS: ICD-10-CM

## 2024-01-05 DIAGNOSIS — K92.1 MELENA: ICD-10-CM

## 2024-01-05 PROCEDURE — 99214 OFFICE O/P EST MOD 30 MIN: CPT | Mod: 25

## 2024-01-22 LAB
ALBUMIN SERPL ELPH-MCNC: 4.1 G/DL
ALP BLD-CCNC: 96 U/L
ALT SERPL-CCNC: 27 U/L
ANION GAP SERPL CALC-SCNC: 11 MMOL/L
AST SERPL-CCNC: 21 U/L
BILIRUB SERPL-MCNC: 0.3 MG/DL
BUN SERPL-MCNC: 17 MG/DL
CALCIUM SERPL-MCNC: 9.3 MG/DL
CHLORIDE SERPL-SCNC: 108 MMOL/L
CO2 SERPL-SCNC: 25 MMOL/L
CREAT SERPL-MCNC: 0.67 MG/DL
EGFR: 103 ML/MIN/1.73M2
GLUCOSE SERPL-MCNC: 106 MG/DL
POTASSIUM SERPL-SCNC: 4.2 MMOL/L
PROT SERPL-MCNC: 6.6 G/DL
SODIUM SERPL-SCNC: 144 MMOL/L

## 2024-01-24 ENCOUNTER — NON-APPOINTMENT (OUTPATIENT)
Age: 59
End: 2024-01-24

## 2024-01-28 ENCOUNTER — APPOINTMENT (OUTPATIENT)
Dept: FAMILY MEDICINE | Facility: CLINIC | Age: 59
End: 2024-01-28
Payer: COMMERCIAL

## 2024-01-28 VITALS
SYSTOLIC BLOOD PRESSURE: 113 MMHG | HEART RATE: 67 BPM | DIASTOLIC BLOOD PRESSURE: 69 MMHG | RESPIRATION RATE: 16 BRPM | WEIGHT: 137 LBS | HEIGHT: 67 IN | BODY MASS INDEX: 21.5 KG/M2 | TEMPERATURE: 97.5 F | OXYGEN SATURATION: 95 %

## 2024-01-28 DIAGNOSIS — Z00.00 ENCOUNTER FOR GENERAL ADULT MEDICAL EXAMINATION W/OUT ABNORMAL FINDINGS: ICD-10-CM

## 2024-01-28 DIAGNOSIS — E78.5 HYPERLIPIDEMIA, UNSPECIFIED: ICD-10-CM

## 2024-01-28 DIAGNOSIS — F41.9 ANXIETY DISORDER, UNSPECIFIED: ICD-10-CM

## 2024-01-28 DIAGNOSIS — K21.9 GASTRO-ESOPHAGEAL REFLUX DISEASE W/OUT ESOPHAGITIS: ICD-10-CM

## 2024-01-28 PROCEDURE — 99396 PREV VISIT EST AGE 40-64: CPT | Mod: 25

## 2024-01-28 PROCEDURE — 36415 COLL VENOUS BLD VENIPUNCTURE: CPT

## 2024-02-08 ENCOUNTER — APPOINTMENT (OUTPATIENT)
Dept: FAMILY MEDICINE | Facility: CLINIC | Age: 59
End: 2024-02-08

## 2024-02-13 ENCOUNTER — APPOINTMENT (OUTPATIENT)
Dept: FAMILY MEDICINE | Facility: CLINIC | Age: 59
End: 2024-02-13

## 2024-03-11 ENCOUNTER — RX RENEWAL (OUTPATIENT)
Age: 59
End: 2024-03-11

## 2024-03-11 RX ORDER — ATORVASTATIN CALCIUM 10 MG/1
10 TABLET, FILM COATED ORAL
Qty: 90 | Refills: 1 | Status: ACTIVE | COMMUNITY
Start: 2023-03-24 | End: 1900-01-01

## 2024-03-25 ENCOUNTER — APPOINTMENT (OUTPATIENT)
Dept: FAMILY MEDICINE | Facility: CLINIC | Age: 59
End: 2024-03-25

## 2024-03-25 ENCOUNTER — APPOINTMENT (OUTPATIENT)
Dept: FAMILY MEDICINE | Facility: CLINIC | Age: 59
End: 2024-03-25
Payer: COMMERCIAL

## 2024-03-25 DIAGNOSIS — J06.9 ACUTE UPPER RESPIRATORY INFECTION, UNSPECIFIED: ICD-10-CM

## 2024-03-25 PROCEDURE — 99213 OFFICE O/P EST LOW 20 MIN: CPT

## 2024-03-25 NOTE — PLAN
[FreeTextEntry1] : Counseled on symptomatic management (rest, hydrate, Tylenol/Advil as needed, nasal saline spray, warm tea, raw honey). Flonase 1 spray each nostril once daily.  Return if no improvement noted.

## 2024-03-25 NOTE — HISTORY OF PRESENT ILLNESS
[Home] : at home, [unfilled] , at the time of the visit. [Verbal consent obtained from patient] : the patient, [unfilled] [Medical Office: ([facility])___] : at the medical office located in  [FreeTextEntry8] : CHANTAL DE LEON is a 58 year old female presenting with cold like symptoms.  No fever or shortness of breath.

## 2024-06-06 ENCOUNTER — APPOINTMENT (OUTPATIENT)
Dept: FAMILY MEDICINE | Facility: CLINIC | Age: 59
End: 2024-06-06
Payer: COMMERCIAL

## 2024-06-06 VITALS
HEIGHT: 67 IN | TEMPERATURE: 97.8 F | DIASTOLIC BLOOD PRESSURE: 64 MMHG | RESPIRATION RATE: 16 BRPM | WEIGHT: 139 LBS | BODY MASS INDEX: 21.82 KG/M2 | OXYGEN SATURATION: 97 % | HEART RATE: 72 BPM | SYSTOLIC BLOOD PRESSURE: 126 MMHG

## 2024-06-06 DIAGNOSIS — H92.02 OTALGIA, LEFT EAR: ICD-10-CM

## 2024-06-06 PROCEDURE — 99213 OFFICE O/P EST LOW 20 MIN: CPT

## 2024-06-06 RX ORDER — FLUTICASONE PROPIONATE 50 UG/1
50 SPRAY, METERED NASAL TWICE DAILY
Qty: 1 | Refills: 0 | Status: ACTIVE | COMMUNITY
Start: 2024-06-06 | End: 1900-01-01

## 2024-06-06 RX ORDER — OMEPRAZOLE 40 MG/1
40 CAPSULE, DELAYED RELEASE ORAL
Qty: 30 | Refills: 0 | Status: DISCONTINUED | COMMUNITY
Start: 2024-01-05 | End: 2024-06-06

## 2024-06-06 NOTE — PHYSICAL EXAM
[No Acute Distress] : no acute distress [Normal Oropharynx] : the oropharynx was normal [No Lymphadenopathy] : no lymphadenopathy [Thyroid Normal, No Nodules] : the thyroid was normal and there were no nodules present [No Respiratory Distress] : no respiratory distress  [Clear to Auscultation] : lungs were clear to auscultation bilaterally [Normal] : affect was normal and insight and judgment were intact [de-identified] : No frontal or maxillary sinus tenderness to palpation, narrow ear canals b/l. Right ear clear-left ear some cerumen dull TM.  No TMJ clicking.

## 2024-06-06 NOTE — PLAN
[FreeTextEntry1] : Start Flonase BID.    If no improvement discussed ear wax causing irritation. Advised patient to start Debrox/Carbamide peroxide drops 3-4 days prior to returning to office for ear wax irrigation of impacted cerumen.   Patient expressed understanding of plan.

## 2024-06-06 NOTE — REVIEW OF SYSTEMS
[Earache] : earache [Negative] : Gastrointestinal [Fever] : no fever [Chills] : no chills [Hearing Loss] : no hearing loss [Sore Throat] : no sore throat [Postnasal Drip] : no postnasal drip [Chest Pain] : no chest pain [Shortness Of Breath] : no shortness of breath [Wheezing] : no wheezing [Cough] : no cough [Dysuria] : no dysuria [Headache] : no headache [Dizziness] : no dizziness [FreeTextEntry4] : Left ear pain  [FreeTextEntry7] : some constipation [de-identified] : no recent vertigo; no sinus pressure

## 2024-06-06 NOTE — HISTORY OF PRESENT ILLNESS
[FreeTextEntry8] : Ear pain started mid May.  Left side.  No recent illness.  Quick shock irritation in ear.  Not constant-brief episodes.  No clicking or popping in ears. Saw dentist checked for TMJ.  Saw GYN yesterday,

## 2024-06-29 ENCOUNTER — RX RENEWAL (OUTPATIENT)
Age: 59
End: 2024-06-29

## 2024-07-02 ENCOUNTER — RX RENEWAL (OUTPATIENT)
Age: 59
End: 2024-07-02

## 2024-08-13 ENCOUNTER — APPOINTMENT (OUTPATIENT)
Dept: FAMILY MEDICINE | Facility: CLINIC | Age: 59
End: 2024-08-13
Payer: COMMERCIAL

## 2024-08-13 VITALS
TEMPERATURE: 97.3 F | BODY MASS INDEX: 21.66 KG/M2 | WEIGHT: 138 LBS | OXYGEN SATURATION: 97 % | HEIGHT: 67 IN | DIASTOLIC BLOOD PRESSURE: 70 MMHG | SYSTOLIC BLOOD PRESSURE: 126 MMHG | HEART RATE: 59 BPM | RESPIRATION RATE: 16 BRPM

## 2024-08-13 DIAGNOSIS — F41.9 ANXIETY DISORDER, UNSPECIFIED: ICD-10-CM

## 2024-08-13 DIAGNOSIS — I77.89 OTHER SPECIFIED DISORDERS OF ARTERIES AND ARTERIOLES: ICD-10-CM

## 2024-08-13 PROCEDURE — 99213 OFFICE O/P EST LOW 20 MIN: CPT

## 2024-09-06 ENCOUNTER — RX RENEWAL (OUTPATIENT)
Age: 59
End: 2024-09-06

## 2024-11-26 ENCOUNTER — APPOINTMENT (OUTPATIENT)
Dept: FAMILY MEDICINE | Facility: CLINIC | Age: 59
End: 2024-11-26
Payer: COMMERCIAL

## 2024-11-26 VITALS
HEART RATE: 64 BPM | RESPIRATION RATE: 16 BRPM | SYSTOLIC BLOOD PRESSURE: 143 MMHG | DIASTOLIC BLOOD PRESSURE: 72 MMHG | TEMPERATURE: 98.2 F | HEIGHT: 67 IN | OXYGEN SATURATION: 97 % | WEIGHT: 138 LBS | BODY MASS INDEX: 21.66 KG/M2

## 2024-11-26 DIAGNOSIS — R10.9 UNSPECIFIED ABDOMINAL PAIN: ICD-10-CM

## 2024-11-26 PROCEDURE — 99213 OFFICE O/P EST LOW 20 MIN: CPT

## 2024-12-23 ENCOUNTER — APPOINTMENT (OUTPATIENT)
Dept: CARDIOLOGY | Facility: CLINIC | Age: 59
End: 2024-12-23
Payer: COMMERCIAL

## 2024-12-23 ENCOUNTER — NON-APPOINTMENT (OUTPATIENT)
Age: 59
End: 2024-12-23

## 2024-12-23 VITALS — SYSTOLIC BLOOD PRESSURE: 122 MMHG | HEART RATE: 69 BPM | OXYGEN SATURATION: 97 % | DIASTOLIC BLOOD PRESSURE: 70 MMHG

## 2024-12-23 VITALS — WEIGHT: 141.19 LBS | BODY MASS INDEX: 22.16 KG/M2 | HEIGHT: 67 IN

## 2024-12-23 DIAGNOSIS — I25.10 ATHEROSCLEROTIC HEART DISEASE OF NATIVE CORONARY ARTERY W/OUT ANGINA PECTORIS: ICD-10-CM

## 2024-12-23 DIAGNOSIS — E78.5 HYPERLIPIDEMIA, UNSPECIFIED: ICD-10-CM

## 2024-12-23 PROCEDURE — 93000 ELECTROCARDIOGRAM COMPLETE: CPT

## 2024-12-23 PROCEDURE — G2211 COMPLEX E/M VISIT ADD ON: CPT | Mod: NC

## 2024-12-23 PROCEDURE — 99213 OFFICE O/P EST LOW 20 MIN: CPT

## 2025-01-07 ENCOUNTER — APPOINTMENT (OUTPATIENT)
Dept: FAMILY MEDICINE | Facility: CLINIC | Age: 60
End: 2025-01-07
Payer: COMMERCIAL

## 2025-01-07 VITALS
DIASTOLIC BLOOD PRESSURE: 77 MMHG | SYSTOLIC BLOOD PRESSURE: 136 MMHG | HEART RATE: 71 BPM | TEMPERATURE: 98.2 F | OXYGEN SATURATION: 96 % | BODY MASS INDEX: 22.29 KG/M2 | WEIGHT: 142 LBS | RESPIRATION RATE: 16 BRPM | HEIGHT: 67 IN

## 2025-01-07 DIAGNOSIS — F41.9 ANXIETY DISORDER, UNSPECIFIED: ICD-10-CM

## 2025-01-07 PROCEDURE — 99213 OFFICE O/P EST LOW 20 MIN: CPT

## 2025-01-16 ENCOUNTER — APPOINTMENT (OUTPATIENT)
Dept: INTERNAL MEDICINE | Facility: CLINIC | Age: 60
End: 2025-01-16
Payer: COMMERCIAL

## 2025-01-16 PROCEDURE — 93306 TTE W/DOPPLER COMPLETE: CPT

## 2025-03-24 ENCOUNTER — RX RENEWAL (OUTPATIENT)
Age: 60
End: 2025-03-24

## 2025-04-14 ENCOUNTER — NON-APPOINTMENT (OUTPATIENT)
Age: 60
End: 2025-04-14

## 2025-04-15 ENCOUNTER — APPOINTMENT (OUTPATIENT)
Dept: FAMILY MEDICINE | Facility: CLINIC | Age: 60
End: 2025-04-15
Payer: COMMERCIAL

## 2025-04-15 VITALS
RESPIRATION RATE: 16 BRPM | HEART RATE: 54 BPM | SYSTOLIC BLOOD PRESSURE: 125 MMHG | BODY MASS INDEX: 22.29 KG/M2 | OXYGEN SATURATION: 96 % | HEIGHT: 67 IN | TEMPERATURE: 98.2 F | DIASTOLIC BLOOD PRESSURE: 72 MMHG | WEIGHT: 142 LBS

## 2025-04-15 DIAGNOSIS — F41.9 ANXIETY DISORDER, UNSPECIFIED: ICD-10-CM

## 2025-04-15 DIAGNOSIS — E78.5 HYPERLIPIDEMIA, UNSPECIFIED: ICD-10-CM

## 2025-04-15 DIAGNOSIS — Z00.00 ENCOUNTER FOR GENERAL ADULT MEDICAL EXAMINATION W/OUT ABNORMAL FINDINGS: ICD-10-CM

## 2025-04-15 DIAGNOSIS — K21.9 GASTRO-ESOPHAGEAL REFLUX DISEASE W/OUT ESOPHAGITIS: ICD-10-CM

## 2025-04-15 PROCEDURE — 36415 COLL VENOUS BLD VENIPUNCTURE: CPT

## 2025-04-15 PROCEDURE — 99396 PREV VISIT EST AGE 40-64: CPT

## 2025-08-22 ENCOUNTER — APPOINTMENT (OUTPATIENT)
Dept: FAMILY MEDICINE | Facility: CLINIC | Age: 60
End: 2025-08-22
Payer: COMMERCIAL

## 2025-08-22 ENCOUNTER — LABORATORY RESULT (OUTPATIENT)
Age: 60
End: 2025-08-22

## 2025-08-22 VITALS
DIASTOLIC BLOOD PRESSURE: 67 MMHG | RESPIRATION RATE: 16 BRPM | HEART RATE: 52 BPM | OXYGEN SATURATION: 97 % | WEIGHT: 142 LBS | SYSTOLIC BLOOD PRESSURE: 132 MMHG | BODY MASS INDEX: 22.29 KG/M2 | HEIGHT: 67 IN

## 2025-08-22 DIAGNOSIS — R73.09 OTHER ABNORMAL GLUCOSE: ICD-10-CM
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
## 2025-08-22 DIAGNOSIS — E78.5 HYPERLIPIDEMIA, UNSPECIFIED: ICD-10-CM

## 2025-08-22 DIAGNOSIS — E55.9 VITAMIN D DEFICIENCY, UNSPECIFIED: ICD-10-CM

## 2025-08-22 PROCEDURE — 99214 OFFICE O/P EST MOD 30 MIN: CPT | Mod: 25

## 2025-08-22 PROCEDURE — 36415 COLL VENOUS BLD VENIPUNCTURE: CPT

## 2025-08-24 LAB
25(OH)D3 SERPL-MCNC: 28.8 NG/ML
ALBUMIN SERPL ELPH-MCNC: 4.2 G/DL
ALP BLD-CCNC: 117 U/L
ALT SERPL-CCNC: 27 U/L
ANION GAP SERPL CALC-SCNC: 12 MMOL/L
APPEARANCE: CLEAR
AST SERPL-CCNC: 26 U/L
BASOPHILS # BLD AUTO: 0.02 K/UL
BASOPHILS NFR BLD AUTO: 0.4 %
BILIRUB SERPL-MCNC: 0.6 MG/DL
BILIRUBIN URINE: NEGATIVE
BLOOD URINE: NEGATIVE
BUN SERPL-MCNC: 12 MG/DL
CALCIUM SERPL-MCNC: 9.3 MG/DL
CHLORIDE SERPL-SCNC: 105 MMOL/L
CHOLEST SERPL-MCNC: 149 MG/DL
CO2 SERPL-SCNC: 26 MMOL/L
COLOR: YELLOW
CREAT SERPL-MCNC: 0.72 MG/DL
EGFRCR SERPLBLD CKD-EPI 2021: 97 ML/MIN/1.73M2
EOSINOPHIL # BLD AUTO: 0.17 K/UL
EOSINOPHIL NFR BLD AUTO: 3.5 %
ESTIMATED AVERAGE GLUCOSE: 120 MG/DL
GLUCOSE QUALITATIVE U: NEGATIVE MG/DL
GLUCOSE SERPL-MCNC: 95 MG/DL
HBA1C MFR BLD HPLC: 5.8 %
HCT VFR BLD CALC: 39.3 %
HDLC SERPL-MCNC: 50 MG/DL
HGB BLD-MCNC: 13 G/DL
IMM GRANULOCYTES NFR BLD AUTO: 0.2 %
KETONES URINE: NEGATIVE MG/DL
LDLC SERPL-MCNC: 82 MG/DL
LEUKOCYTE ESTERASE URINE: ABNORMAL
LYMPHOCYTES # BLD AUTO: 1.84 K/UL
LYMPHOCYTES NFR BLD AUTO: 38.1 %
MAN DIFF?: NORMAL
MCHC RBC-ENTMCNC: 32.4 PG
MCHC RBC-ENTMCNC: 33.1 G/DL
MCV RBC AUTO: 98 FL
MONOCYTES # BLD AUTO: 0.42 K/UL
MONOCYTES NFR BLD AUTO: 8.7 %
NEUTROPHILS # BLD AUTO: 2.37 K/UL
NEUTROPHILS NFR BLD AUTO: 49.1 %
NITRITE URINE: NEGATIVE
NONHDLC SERPL-MCNC: 99 MG/DL
PH URINE: 8
PLATELET # BLD AUTO: 206 K/UL
POTASSIUM SERPL-SCNC: 4.5 MMOL/L
PROT SERPL-MCNC: 7.1 G/DL
PROTEIN URINE: NEGATIVE MG/DL
RBC # BLD: 4.01 M/UL
RBC # FLD: 12.2 %
SODIUM SERPL-SCNC: 142 MMOL/L
SPECIFIC GRAVITY URINE: 1.02
TRIGL SERPL-MCNC: 94 MG/DL
TSH SERPL-ACNC: 2.25 UIU/ML
UROBILINOGEN URINE: 0.2 MG/DL
WBC # FLD AUTO: 4.83 K/UL